# Patient Record
Sex: FEMALE | URBAN - METROPOLITAN AREA
[De-identification: names, ages, dates, MRNs, and addresses within clinical notes are randomized per-mention and may not be internally consistent; named-entity substitution may affect disease eponyms.]

---

## 2023-10-27 ENCOUNTER — TELEPHONE (OUTPATIENT)
Dept: GASTROENTEROLOGY | Age: 73
End: 2023-10-27

## 2023-11-06 ENCOUNTER — HOSPITAL ENCOUNTER (EMERGENCY)
Age: 73
Discharge: HOME | End: 2023-11-06
Payer: MEDICARE

## 2023-11-06 VITALS
DIASTOLIC BLOOD PRESSURE: 89 MMHG | OXYGEN SATURATION: 96 % | RESPIRATION RATE: 14 BRPM | TEMPERATURE: 97.8 F | HEART RATE: 54 BPM | SYSTOLIC BLOOD PRESSURE: 160 MMHG

## 2023-11-06 VITALS — HEART RATE: 51 BPM | RESPIRATION RATE: 15 BRPM | SYSTOLIC BLOOD PRESSURE: 155 MMHG | DIASTOLIC BLOOD PRESSURE: 72 MMHG

## 2023-11-06 VITALS — HEART RATE: 53 BPM | RESPIRATION RATE: 18 BRPM

## 2023-11-06 VITALS — HEART RATE: 51 BPM | RESPIRATION RATE: 17 BRPM

## 2023-11-06 VITALS — SYSTOLIC BLOOD PRESSURE: 155 MMHG | RESPIRATION RATE: 18 BRPM | DIASTOLIC BLOOD PRESSURE: 78 MMHG | HEART RATE: 48 BPM

## 2023-11-06 VITALS — DIASTOLIC BLOOD PRESSURE: 67 MMHG | RESPIRATION RATE: 16 BRPM | SYSTOLIC BLOOD PRESSURE: 168 MMHG | HEART RATE: 55 BPM

## 2023-11-06 VITALS — SYSTOLIC BLOOD PRESSURE: 134 MMHG | RESPIRATION RATE: 21 BRPM | HEART RATE: 54 BPM | DIASTOLIC BLOOD PRESSURE: 101 MMHG

## 2023-11-06 VITALS — HEART RATE: 53 BPM | RESPIRATION RATE: 17 BRPM

## 2023-11-06 VITALS — HEART RATE: 55 BPM | RESPIRATION RATE: 17 BRPM

## 2023-11-06 VITALS — HEART RATE: 52 BPM | RESPIRATION RATE: 15 BRPM

## 2023-11-06 VITALS — HEART RATE: 54 BPM | RESPIRATION RATE: 16 BRPM

## 2023-11-06 VITALS — HEART RATE: 47 BPM | SYSTOLIC BLOOD PRESSURE: 158 MMHG | RESPIRATION RATE: 15 BRPM | DIASTOLIC BLOOD PRESSURE: 73 MMHG

## 2023-11-06 VITALS — HEART RATE: 64 BPM | RESPIRATION RATE: 16 BRPM

## 2023-11-06 VITALS — HEART RATE: 56 BPM | RESPIRATION RATE: 19 BRPM

## 2023-11-06 VITALS — RESPIRATION RATE: 14 BRPM | HEART RATE: 51 BPM

## 2023-11-06 VITALS — RESPIRATION RATE: 21 BRPM | HEART RATE: 55 BPM

## 2023-11-06 VITALS
SYSTOLIC BLOOD PRESSURE: 174 MMHG | RESPIRATION RATE: 16 BRPM | OXYGEN SATURATION: 98 % | HEART RATE: 58 BPM | DIASTOLIC BLOOD PRESSURE: 82 MMHG

## 2023-11-06 VITALS — HEART RATE: 55 BPM | RESPIRATION RATE: 16 BRPM

## 2023-11-06 VITALS — RESPIRATION RATE: 19 BRPM | HEART RATE: 66 BPM

## 2023-11-06 VITALS — RESPIRATION RATE: 24 BRPM | HEART RATE: 63 BPM

## 2023-11-06 VITALS — DIASTOLIC BLOOD PRESSURE: 98 MMHG | SYSTOLIC BLOOD PRESSURE: 156 MMHG | HEART RATE: 55 BPM | RESPIRATION RATE: 18 BRPM

## 2023-11-06 VITALS — HEART RATE: 50 BPM

## 2023-11-06 VITALS — HEART RATE: 64 BPM | RESPIRATION RATE: 18 BRPM

## 2023-11-06 VITALS — RESPIRATION RATE: 20 BRPM | HEART RATE: 58 BPM

## 2023-11-06 VITALS — HEART RATE: 61 BPM | RESPIRATION RATE: 22 BRPM

## 2023-11-06 VITALS — RESPIRATION RATE: 19 BRPM | HEART RATE: 45 BPM

## 2023-11-06 VITALS — BODY MASS INDEX: 31 KG/M2

## 2023-11-06 DIAGNOSIS — Z79.84: ICD-10-CM

## 2023-11-06 DIAGNOSIS — K59.00: ICD-10-CM

## 2023-11-06 DIAGNOSIS — Z98.84: ICD-10-CM

## 2023-11-06 DIAGNOSIS — I10: ICD-10-CM

## 2023-11-06 DIAGNOSIS — Z79.899: ICD-10-CM

## 2023-11-06 DIAGNOSIS — N31.9: ICD-10-CM

## 2023-11-06 DIAGNOSIS — Z85.038: ICD-10-CM

## 2023-11-06 DIAGNOSIS — N39.0: Primary | ICD-10-CM

## 2023-11-06 LAB
ADD MANUAL DIFF: NO
ANION GAP: 13.7
BLOOD UREA NITROGEN: 14 MG/DL (ref 7–18)
CALCIUM: 9 MG/DL (ref 8.5–10.1)
CARBON DIOXIDE: 23 MMOL/L (ref 21–32)
CAST SEEN?: (no result) #/LPF
CHLORIDE: 108 MMOL/L (ref 98–107)
CO2 BLD-SCNC: 23 MMOL/L (ref 21–32)
ESTIMATED GFR (AFRICAN AMERICA: >60 (ref 60–?)
ESTIMATED GFR (NON-AFRICAN AME: >60 (ref 60–?)
GLUCOSE BLD-MCNC: 132 MG/DL (ref 74–106)
GLUCOSE URINE UA: NEGATIVE MG/DL
HCT VFR BLD CALC: 34.1 % (ref 36–48)
HEMATOCRIT: 34.1 % (ref 36–48)
HEMOGLOBIN: 11.5 G/DL (ref 12–16)
IMMATURE GRANULOCYTES ABS AUTO: 0.01 10^3/UL (ref 0–0.03)
IMMATURE GRANULOCYTES PCT AUTO: 0.2 % (ref 0–0.5)
LYMPHOCYTES  ABSOLUTE AUTO: 1.6 10^3/UL (ref 1.2–3.8)
MCV RBC: 93.9 FL (ref 81–99)
MEAN CORPUSCULAR HEMOGLOBIN: 31.7 PG (ref 26.7–34)
MEAN CORPUSCULAR HGB CONC: 33.7 G/DL (ref 29.9–35.2)
MEAN CORPUSCULAR VOLUME: 93.9 FL (ref 81–99)
PLATELET # BLD: 197 10^3/UL (ref 150–450)
PLATELET COUNT: 197 10^3/UL (ref 150–450)
POTASSIUM SERPLBLD-SCNC: 3.7 MMOL/L (ref 3.5–5.1)
POTASSIUM: 3.7 MMOL/L (ref 3.5–5.1)
RED BLOOD COUNT: 3.63 10^6/UL (ref 4.2–5.4)
SODIUM BLD-SCNC: 141 MMOL/L (ref 136–145)
SODIUM: 141 MMOL/L (ref 136–145)
URINE CULTURE INDICATED: YES
WBC # BLD: 4.7 10^3/UL (ref 4–11)
WHITE BLOOD COUNT: 4.7 10^3/UL (ref 4–11)

## 2023-11-06 PROCEDURE — 96375 TX/PRO/DX INJ NEW DRUG ADDON: CPT

## 2023-11-06 PROCEDURE — 87150 DNA/RNA AMPLIFIED PROBE: CPT

## 2023-11-06 PROCEDURE — 99285 EMERGENCY DEPT VISIT HI MDM: CPT

## 2023-11-06 PROCEDURE — 93005 ELECTROCARDIOGRAM TRACING: CPT

## 2023-11-06 PROCEDURE — 80048 BASIC METABOLIC PNL TOTAL CA: CPT

## 2023-11-06 PROCEDURE — 96365 THER/PROPH/DIAG IV INF INIT: CPT

## 2023-11-06 PROCEDURE — 81001 URINALYSIS AUTO W/SCOPE: CPT

## 2023-11-06 PROCEDURE — 36415 COLL VENOUS BLD VENIPUNCTURE: CPT

## 2023-11-06 PROCEDURE — 87186 SC STD MICRODIL/AGAR DIL: CPT

## 2023-11-06 PROCEDURE — 87086 URINE CULTURE/COLONY COUNT: CPT

## 2023-11-06 PROCEDURE — 74022 RADEX COMPL AQT ABD SERIES: CPT

## 2023-11-06 PROCEDURE — 85025 COMPLETE CBC W/AUTO DIFF WBC: CPT

## 2023-11-06 NOTE — ECG_ITS
[facility] 
                                        
                                       Test Date:    2023 
Pat Name:     YONAS CHANDLER            Department:    
Patient ID:   [id number]               Room:         - 
Gender:       Female                   Technician:    
:          1950               Requested By: [id number] 
Order Number: [id number]              Reading MD:   JONNY MATHEW 
                                 Measurements 
Intervals                              Axis           
Rate:         56                       P:            60 
MT:           226                      QRS:          46 
QRSD:         82                       T:            61 
QT:           420                                     
QTc:          410                                     
                           Interpretive Statements 
1100 Sinus rhythm 
1102 Sinus arrhythmia 
2231 First degree AV block 
9150 **  abnormal ECG  ** 
No previous ECG available for comparison 
Electronically Signed On 2023 6:22:40 EST by JONNY MATHEW

## 2023-11-06 NOTE — XR_ITS
The [address] [address] 
     [phone] 
  
  
Patient Name: 
YONAS CHANDLER 
  
MRN: [id number]    YOB: 1950    Sex: F 
Assigned Patient Location: ER 
Current Patient Location: ER 
Accession/Order Number: [id number] 
Exam Date: 11/06/2023  11:55    Report Date: 11/06/2023  13:21 
  
At the request of: 
JERAMIE PERAZA   
  
Procedure:  XR acute abdomen series 
  
XR acute abdomen series, 11/6/2023 11:55 AM EST,  
  
INDICATION: pain 
  
COMPARISON: None 
  
TECHNIQUE: Supine and upright views of the abdomen obtained. A single view of  
the chest. 
  
FINDINGS: 
  
The heart is borderline enlarged.  
There is mild aortic calcification. Lungs appear clear.  
There is no evidence of pneumothorax or pleural effusion. 
  
Lap band prosthesis is seen in place. 
Multiple surgical sutures are seen consistent with bowel anastomosis. There is  
  
a large amount of stool in the right colon. No obstruction or free air is  
seen. 
No suspicious calcifications are projected over the kidneys, ureters or  
bladder. 
No mass effect or organomegaly is seen. 
The osseous and surrounding soft tissue structures appear within normal  
limits. 
  
ORDER #: [id number] XR/XR acute abdomen series  
IMPRESSION:  
Status post abdominal surgery. Question constipation.  
   
No obstruction or free air is seen.  
   
No active pulmonary process is identified.  
   
   
Electronically authenticated by: LUIS NARVAEZ   Date: 11/06/2023  13:21

## 2023-11-06 NOTE — ED.GENADUL1
HPI - General Adult
General
Chief complaint: Abdominal Pain
Stated complaint: DIZZINESS
Time Seen by Provider: 11/06/23 10:08
Source: patient
Mode of arrival: ambulance
History of Present Illness
HPI narrative: 
73-year-old female presents from home by paramedics for urinary frequency, dysuria, and dizziness. She's had some ongoing abdominal issues. The symptoms started within the last day. No known fever and no vomiting today.
Related Data
Home Medications

 Medication  Instructions  Recorded  Confirmed
amlodipine 5 mg tablet 5 mg PO QDAY 11/06/23 11/06/23
ezetimibe 10 mg-simvastatin 10 mg 1 tab PO .qhs 11/06/23 11/06/23
tablet   
metformin 500 mg tablet 500 mg PO BID 11/06/23 11/06/23
metoprolol tartrate 50 mg tablet 50 mg PO DAILY 11/06/23 11/06/23
quetiapine 25 mg tablet 25 mg PO TID 11/06/23 11/06/23
valsartan 160 mg tablet 160 mg PO DAILY 11/06/23 11/06/23

Previous Rx's

 Medication  Instructions  Recorded
cephalexin 500 mg capsule 500 mg PO TID 7 days #21 caps 11/06/23
ondansetron 4 mg disintegrating 4 mg PO Q6H PRN nausea and 11/06/23
tablet vomiting #20 tabs 


Allergies

Allergy/AdvReac Type Severity Reaction Status Date / Time
No Known Drug Allergies Allergy   Verified 11/06/23 10:06



Review of Systems
ROS  
 Narrative A ten point review of systems is negative except as noted above.   

PFSH
PFSH
Medical History (Updated 11/06/23 @ 13:40 by Moustapha Perkins MD)

Bladder dysfunction
 ?N31.9 - Neuromuscular dysfunction of bladder, unspecified (ICD-10)
Brain aneurysm
 ?I67.1 - Cerebral aneurysm, nonruptured (ICD-10)
Colon cancer
 ?C18.9 - Malignant neoplasm of colon, unspecified (ICD-10)
HTN (hypertension)
 ?I10 - Essential (primary) hypertension (ICD-10)


Surgical History (Updated 11/06/23 @ 10:48 by Martina Hills)

S/P coil embolization of cerebral aneurysm
 ?Z98.890 - Other specified postprocedural states (ICD-10)
Status post gastric banding surgery
 ?Z98.84 - Bariatric surgery status (ICD-10)



Exam
Narrative
Exam Narrative: 
Nurses note and vital signs reviewed and patient is not hypoxic.

General:  The patient appears well and in no apparent distress.  Patient is resting comfortably on cart.
Skin:  Warm, dry, no pallor noted.  There is no rash noted.
Head:  Normocephalic, atraumatic
Eye: Normal conjunctiva, no drainage
Ears, Nose, Mouth, and Throat: oral mucosa is moist. Nares patent. 
Cardiovascular:  Regular Rate and Rhythm
Respiratory:  Patient is in no distress, no accessory muscle use, lungs are clear to auscultation, no wheezing, rales or rhonchi
Back:  non-tender, no CVA tenderness bilaterally to percussion.
GI:  soft and nontender
Musculoskeletal: The patient has no evidence of calf tenderness, no pitting edema, symmetrical pulses noted bilaterally
Neurological:  A&O, normal speech
Psychiatric:  Cooperative
Constitutional
Vital Signs, click to edit/add: 

Last Vital Signs

Pulse  58 L  11/06/23 13:30
Resp  20   11/06/23 13:30
BP  156/98 H  11/06/23 11:31
Pulse Ox  98   11/06/23 10:02
O2 Del Method  Room Air  11/06/23 10:02




Course
Vital Signs
Vital signs: 

Vital Signs

Pulse Rate  58 L  11/06/23 10:02
Respiratory Rate  16   11/06/23 10:02
Blood Pressure  174/82 H  11/06/23 10:02
Pulse Oximetry  98   11/06/23 10:02
Oxygen Delivery Method  Room Air  11/06/23 10:02



Pulse Rate  58 L  11/06/23 13:30
Respiratory Rate  20   11/06/23 13:30
Blood Pressure  156/98 H  11/06/23 11:31
Pulse Oximetry  98   11/06/23 10:02
Oxygen Delivery Method  Room Air  11/06/23 10:02




Medical Decision Making
[facility] Narrative
Medical decision making narrative: 
urinary tract infection and constipation are identified. She was recommended over-the-counter MiraLAX. She was given IV Rocephin here. Treatment diagnosis and disposition were discussed with the patient.
Differential Diagnosis
Differential Diagnosis: urinary tract infection, constipation, chronic abdominal pain
Lab Data
Lab results reviewed: Yes I reviewed the patient's lab results
Labs: 

Lab Results

  11/06/23 11/06/23 Range/Units
  10:20 10:28 
WBC   4.7  (4.0-11.0)  10^3/uL
RBC   3.63 L  (4.20-5.40)  10^6/uL
Hgb   11.5 L  (12.0-16.0)  g/dL
Hct   34.1 L  (36.0-48.0)  %
MCV   93.9  (81.0-99.0)  fL
MCH   31.7  (26.7-34.0)  pg
MCHC   33.7  (29.9-35.2)  g/dL
RDW   12.8  (11.0-15.0)  %
Plt Count   197  (150-450)  10^3/uL
MPV   10.1  (9.5-13.5)  fL
Neut % (Auto)   60.8  (43.0-75.0)  %
Lymph % (Auto)   33.3  (20.5-60.0)  %
Mono % (Auto)   4.9  (1.7-12.0)  %
Eos % (Auto)   0.4 L  (0.9-7.0)  %
Baso % (Auto)   0.4  (0.2-2.0)  %
Neut # (Auto)   2.9  (1.4-6.5)  10^3/uL
Lymph # (Auto)   1.6  (1.2-3.8)  10^3/uL
Mono # (Auto)   0.2 L  (0.3-0.8)  10^3/uL
Eos # (Auto)   0.0  (0.0-0.7)  10^3/uL
Baso # (Auto)   0.0  (0.0-0.1)  10^3/uL
Abs Immat Gran (auto)   0.01  (0.00-0.03)  10^3/uL
Imm/Tot Granulo (auto)   0.2  (0.0-0.5)  %
Sodium   141  (136-145)  mmol/L
Potassium   3.7  (3.5-5.1)  mmol/L
Chloride   108 H  ()  mmol/L
Carbon Dioxide   23.0  (21.0-32.0)  mmol/L
Anion Gap   13.7  
BUN   14.0  (7.0-18.0)  mg/dL
Creatinine   0.73  (0.55-1.02)  mg/dL
Est GFR ( Amer)   >60  (>=60)  
Est GFR (Non-Af Amer)   >60  (>=60)  
BUN/Creatinine Ratio   19.2  
Glucose   132 H  ()  mg/dL
Calcium   9.0  (8.5-10.1)  mg/dL
Urine Color  Lt. yellow   (YELLOW)  
Urine Clarity  Slightly cloudy A   (CLEAR)  
Urine pH  5.5   (5.0-9.0)  
Ur Specific Gravity  >=1.030 A   (1.005-1.025)  
Urine Protein  Trace   (NEG/TRACE)  mg/dL
Urine Glucose (UA)  Negative   (NEGATIVE)  mg/dL
Urine Ketones  Trace A   (NEGATIVE)  mg/dL
Urine Occult Blood  Trace-i   (NEGATIVE)  
Urine Nitrite  Negative   (NEGATIVE)  
Urine Bilirubin  Negative   (NEGATIVE)  
Urine Urobilinogen  0.2   (0.2-1.0)  EU/dL
Ur Leukocyte Esterase  Moderate A   (NEGATIVE)  
Urine RBC  0-2   (0-2)  #/HPF
Urine WBC  20-50 A   (NONE SEEN)  #/HPF
Ur Squamous Epith Cells  None seen   (NONE/RARE)  #/LPF
Urine Crystals  None seen   (None Seen)  #/HPF
Urine Bacteria  Trace A   (NONE SEEN)  #/HPF
Urine Casts  None seen   (NONE SEEN)  #/LPF
Urine Mucus  None seen   (NONE SEEN)  
Ur Culture Indicated?  Yes   



Imaging Data
Abdominal x-ray: 
      Radiologist's impression: 
Procedure:  XR acute abdomen
 series
XR acute abdomen series, 11/6/2023 11:55 AM
 EST, 
INDICATION: pain
COMPARISON: None
TECHNIQUE: Supine and upright views
 of the abdomen obtained. A single view
 of 
the chest.
FINDINGS:
The heart is
 borderline enlarged. 
There is mild aortic calcification. Lungs appear clear.
There is no evidence of pneumothorax or pleural effusion.
Lap band prosthesis is seen in place.
Multiple surgical sutures are seen consistent with bowel anastomosis. There is
a large 
amount of stool in the right
 colon.
 No obstruction
 or free
 air
 is seen.
No suspicious
 calcifications are projected over the kidneys, ureters or 
bladder.
No mass effect or organomegaly is seen.
The osseous
 and surrounding soft tissue structures appear
 within normal
 limits.
IMPRESSION:
Status post abdominal
 surgery.
 Question constipation.
No obstruction
 or
 free
 air is
 seen.
No active
 pulmonary process is
 identified.
Electronically authenticated by: LUIS NARVAEZ   Date: 11/06/2023  13:21

Discharge Plan
Discharge
Chief Complaint: Abdominal Pain

Clinical Impression:
 Acute UTI, Constipation


Patient Disposition: Home, Self-Care

Time of Disposition Decision: 13:29

Condition: Good

Mode of Transportation: Private Vehicle

Prescriptions / Home Meds:
New
  cephalexin 500 mg capsule 
   500 mg PO TID 7 Days Qty: 21 0RF
  ondansetron 4 mg tablet,disintegrating 
   4 mg PO Q6H PRN (Reason: nausea and vomiting) Qty: 20 0RF

No Action
  amlodipine 5 mg tablet 
   5 mg PO QDAY 
  ezetimibe-simvastatin 10-10 mg tablet 
   1 tab PO .qhs 
  metformin 500 mg tablet 
   500 mg PO BID 
  metoprolol tartrate 50 mg tablet 
   50 mg PO DAILY 
  quetiapine 25 mg tablet 
   25 mg PO TID 
  valsartan 160 mg tablet 
   160 mg PO DAILY 

Instructions:  Constipation (ED), Urinary Tract Infection in Women (ED)

Additional Instructions:
over-the-counter MiraLAX for constipation

Stand Alone Forms:  Portal Instructions

Referrals:
NATIVIDAD GRANDA [Primary Care Provider] - 1 week